# Patient Record
Sex: FEMALE | Race: WHITE | NOT HISPANIC OR LATINO | URBAN - NONMETROPOLITAN AREA
[De-identification: names, ages, dates, MRNs, and addresses within clinical notes are randomized per-mention and may not be internally consistent; named-entity substitution may affect disease eponyms.]

---

## 2019-01-04 ENCOUNTER — HOSPITAL ENCOUNTER (EMERGENCY)
Facility: HOSPITAL | Age: 39
Discharge: PSYCHIATRIC OR SUBSTANCE USE FACILITY | End: 2019-01-05
Attending: EMERGENCY MEDICINE | Admitting: NURSE PRACTITIONER

## 2019-01-04 VITALS
WEIGHT: 130 LBS | RESPIRATION RATE: 16 BRPM | HEIGHT: 62 IN | DIASTOLIC BLOOD PRESSURE: 65 MMHG | SYSTOLIC BLOOD PRESSURE: 109 MMHG | TEMPERATURE: 97.7 F | BODY MASS INDEX: 23.92 KG/M2 | OXYGEN SATURATION: 98 % | HEART RATE: 106 BPM

## 2019-01-04 DIAGNOSIS — F19.10 POLYSUBSTANCE ABUSE (HCC): Primary | ICD-10-CM

## 2019-01-04 LAB
6-ACETYL MORPHINE: NEGATIVE
ALBUMIN SERPL-MCNC: 4.4 G/DL (ref 3.5–5)
ALBUMIN/GLOB SERPL: 1.1 G/DL (ref 1.5–2.5)
ALP SERPL-CCNC: 67 U/L (ref 35–104)
ALT SERPL W P-5'-P-CCNC: 27 U/L (ref 10–36)
AMORPH URATE CRY URNS QL MICRO: ABNORMAL /HPF
AMPHET+METHAMPHET UR QL: POSITIVE
ANION GAP SERPL CALCULATED.3IONS-SCNC: 10.6 MMOL/L (ref 3.6–11.2)
AST SERPL-CCNC: 32 U/L (ref 10–30)
B-HCG UR QL: NEGATIVE
BACTERIA UR QL AUTO: ABNORMAL /HPF
BARBITURATES UR QL SCN: NEGATIVE
BASOPHILS # BLD AUTO: 0.02 10*3/MM3 (ref 0–0.3)
BASOPHILS NFR BLD AUTO: 0.2 % (ref 0–2)
BENZODIAZ UR QL SCN: NEGATIVE
BILIRUB SERPL-MCNC: 0.4 MG/DL (ref 0.2–1.8)
BILIRUB UR QL STRIP: NEGATIVE
BUN BLD-MCNC: 14 MG/DL (ref 7–21)
BUN/CREAT SERPL: 21.2 (ref 7–25)
BUPRENORPHINE SERPL-MCNC: POSITIVE NG/ML
CALCIUM SPEC-SCNC: 9.2 MG/DL (ref 7.7–10)
CANNABINOIDS SERPL QL: NEGATIVE
CHLORIDE SERPL-SCNC: 103 MMOL/L (ref 99–112)
CLARITY UR: ABNORMAL
CO2 SERPL-SCNC: 23.4 MMOL/L (ref 24.3–31.9)
COCAINE UR QL: NEGATIVE
COLOR UR: ABNORMAL
CREAT BLD-MCNC: 0.66 MG/DL (ref 0.43–1.29)
DEPRECATED RDW RBC AUTO: 46.6 FL (ref 37–54)
EOSINOPHIL # BLD AUTO: 0.16 10*3/MM3 (ref 0–0.7)
EOSINOPHIL NFR BLD AUTO: 1.4 % (ref 0–5)
ERYTHROCYTE [DISTWIDTH] IN BLOOD BY AUTOMATED COUNT: 17.4 % (ref 11.5–14.5)
ETHANOL BLD-MCNC: <10 MG/DL
ETHANOL UR QL: <0.01 %
GFR SERPL CREATININE-BSD FRML MDRD: 101 ML/MIN/1.73
GLOBULIN UR ELPH-MCNC: 3.9 GM/DL
GLUCOSE BLD-MCNC: 83 MG/DL (ref 70–110)
GLUCOSE UR STRIP-MCNC: NEGATIVE MG/DL
HCT VFR BLD AUTO: 30.7 % (ref 37–47)
HGB BLD-MCNC: 9.1 G/DL (ref 12–16)
HGB UR QL STRIP.AUTO: NEGATIVE
HYALINE CASTS UR QL AUTO: ABNORMAL /LPF
IMM GRANULOCYTES # BLD AUTO: 0.03 10*3/MM3 (ref 0–0.03)
IMM GRANULOCYTES NFR BLD AUTO: 0.3 % (ref 0–0.5)
KETONES UR QL STRIP: ABNORMAL
LEUKOCYTE ESTERASE UR QL STRIP.AUTO: NEGATIVE
LYMPHOCYTES # BLD AUTO: 1.17 10*3/MM3 (ref 1–3)
LYMPHOCYTES NFR BLD AUTO: 10 % (ref 21–51)
MCH RBC QN AUTO: 23.1 PG (ref 27–33)
MCHC RBC AUTO-ENTMCNC: 29.6 G/DL (ref 33–37)
MCV RBC AUTO: 77.9 FL (ref 80–94)
METHADONE UR QL SCN: NEGATIVE
MONOCYTES # BLD AUTO: 0.59 10*3/MM3 (ref 0.1–0.9)
MONOCYTES NFR BLD AUTO: 5 % (ref 0–10)
NEUTROPHILS # BLD AUTO: 9.73 10*3/MM3 (ref 1.4–6.5)
NEUTROPHILS NFR BLD AUTO: 83.1 % (ref 30–70)
NITRITE UR QL STRIP: NEGATIVE
OPIATES UR QL: NEGATIVE
OSMOLALITY SERPL CALC.SUM OF ELEC: 273.4 MOSM/KG (ref 273–305)
OXYCODONE UR QL SCN: NEGATIVE
PCP UR QL SCN: NEGATIVE
PH UR STRIP.AUTO: 6 [PH] (ref 5–8)
PLATELET # BLD AUTO: 759 10*3/MM3 (ref 130–400)
PMV BLD AUTO: 9.6 FL (ref 6–10)
POTASSIUM BLD-SCNC: 3.4 MMOL/L (ref 3.5–5.3)
PROT SERPL-MCNC: 8.3 G/DL (ref 6–8)
PROT UR QL STRIP: ABNORMAL
RBC # BLD AUTO: 3.94 10*6/MM3 (ref 4.2–5.4)
RBC # UR: ABNORMAL /HPF
REF LAB TEST METHOD: ABNORMAL
SODIUM BLD-SCNC: 137 MMOL/L (ref 135–153)
SP GR UR STRIP: >1.03 (ref 1–1.03)
SQUAMOUS #/AREA URNS HPF: ABNORMAL /HPF
UROBILINOGEN UR QL STRIP: ABNORMAL
WBC NRBC COR # BLD: 11.7 10*3/MM3 (ref 4.5–12.5)
WBC UR QL AUTO: ABNORMAL /HPF

## 2019-01-04 PROCEDURE — 80307 DRUG TEST PRSMV CHEM ANLYZR: CPT | Performed by: NURSE PRACTITIONER

## 2019-01-04 PROCEDURE — 85025 COMPLETE CBC W/AUTO DIFF WBC: CPT | Performed by: NURSE PRACTITIONER

## 2019-01-04 PROCEDURE — 81025 URINE PREGNANCY TEST: CPT | Performed by: NURSE PRACTITIONER

## 2019-01-04 PROCEDURE — 81001 URINALYSIS AUTO W/SCOPE: CPT | Performed by: NURSE PRACTITIONER

## 2019-01-04 PROCEDURE — 80053 COMPREHEN METABOLIC PANEL: CPT | Performed by: NURSE PRACTITIONER

## 2019-01-04 PROCEDURE — 99283 EMERGENCY DEPT VISIT LOW MDM: CPT

## 2019-01-04 RX ORDER — POTASSIUM CHLORIDE 20 MEQ/1
20 TABLET, EXTENDED RELEASE ORAL ONCE
Status: DISCONTINUED | OUTPATIENT
Start: 2019-01-04 | End: 2019-01-05 | Stop reason: HOSPADM

## 2019-01-05 ENCOUNTER — HOSPITAL ENCOUNTER (INPATIENT)
Facility: HOSPITAL | Age: 39
LOS: 1 days | Discharge: HOME | End: 2019-01-06
Attending: PSYCHIATRY & NEUROLOGY | Admitting: PSYCHIATRY & NEUROLOGY

## 2019-01-05 PROBLEM — F19.10 POLYSUBSTANCE ABUSE (HCC): Status: ACTIVE | Noted: 2019-01-05

## 2019-01-05 LAB
HIV1+2 AB SER QL: NORMAL
POTASSIUM BLD-SCNC: 3.3 MMOL/L (ref 3.5–5.3)

## 2019-01-05 PROCEDURE — 93010 ELECTROCARDIOGRAM REPORT: CPT | Performed by: INTERNAL MEDICINE

## 2019-01-05 PROCEDURE — 84132 ASSAY OF SERUM POTASSIUM: CPT | Performed by: PSYCHIATRY & NEUROLOGY

## 2019-01-05 PROCEDURE — 99221 1ST HOSP IP/OBS SF/LOW 40: CPT | Performed by: PSYCHIATRY & NEUROLOGY

## 2019-01-05 PROCEDURE — HZ2ZZZZ DETOXIFICATION SERVICES FOR SUBSTANCE ABUSE TREATMENT: ICD-10-PCS | Performed by: PSYCHIATRY & NEUROLOGY

## 2019-01-05 PROCEDURE — G0432 EIA HIV-1/HIV-2 SCREEN: HCPCS | Performed by: PSYCHIATRY & NEUROLOGY

## 2019-01-05 PROCEDURE — 93005 ELECTROCARDIOGRAM TRACING: CPT | Performed by: PSYCHIATRY & NEUROLOGY

## 2019-01-05 RX ORDER — TRAZODONE HYDROCHLORIDE 50 MG/1
50 TABLET ORAL NIGHTLY PRN
Status: DISCONTINUED | OUTPATIENT
Start: 2019-01-05 | End: 2019-01-06 | Stop reason: HOSPADM

## 2019-01-05 RX ORDER — BENZONATATE 100 MG/1
100 CAPSULE ORAL 3 TIMES DAILY PRN
Status: DISCONTINUED | OUTPATIENT
Start: 2019-01-05 | End: 2019-01-06 | Stop reason: HOSPADM

## 2019-01-05 RX ORDER — DICYCLOMINE HYDROCHLORIDE 10 MG/1
10 CAPSULE ORAL 3 TIMES DAILY PRN
Status: DISCONTINUED | OUTPATIENT
Start: 2019-01-05 | End: 2019-01-06 | Stop reason: HOSPADM

## 2019-01-05 RX ORDER — HYDROXYZINE 50 MG/1
50 TABLET, FILM COATED ORAL 3 TIMES DAILY PRN
Status: DISCONTINUED | OUTPATIENT
Start: 2019-01-05 | End: 2019-01-06 | Stop reason: HOSPADM

## 2019-01-05 RX ORDER — CLONIDINE HYDROCHLORIDE 0.1 MG/1
0.1 TABLET ORAL 3 TIMES DAILY PRN
Status: DISCONTINUED | OUTPATIENT
Start: 2019-01-06 | End: 2019-01-06 | Stop reason: HOSPADM

## 2019-01-05 RX ORDER — ECHINACEA PURPUREA EXTRACT 125 MG
2 TABLET ORAL AS NEEDED
Status: DISCONTINUED | OUTPATIENT
Start: 2019-01-05 | End: 2019-01-06 | Stop reason: HOSPADM

## 2019-01-05 RX ORDER — CLONIDINE HYDROCHLORIDE 0.1 MG/1
0.1 TABLET ORAL 4 TIMES DAILY PRN
Status: DISCONTINUED | OUTPATIENT
Start: 2019-01-05 | End: 2019-01-06 | Stop reason: HOSPADM

## 2019-01-05 RX ORDER — CLONIDINE HYDROCHLORIDE 0.1 MG/1
0.1 TABLET ORAL 4 TIMES DAILY PRN
Status: ACTIVE | OUTPATIENT
Start: 2019-01-05 | End: 2019-01-06

## 2019-01-05 RX ORDER — BENZTROPINE MESYLATE 1 MG/ML
0.5 INJECTION INTRAMUSCULAR; INTRAVENOUS DAILY PRN
Status: DISCONTINUED | OUTPATIENT
Start: 2019-01-05 | End: 2019-01-06 | Stop reason: HOSPADM

## 2019-01-05 RX ORDER — LOPERAMIDE HYDROCHLORIDE 2 MG/1
2 CAPSULE ORAL 4 TIMES DAILY PRN
Status: DISCONTINUED | OUTPATIENT
Start: 2019-01-05 | End: 2019-01-06 | Stop reason: HOSPADM

## 2019-01-05 RX ORDER — CLONIDINE HYDROCHLORIDE 0.1 MG/1
0.1 TABLET ORAL 2 TIMES DAILY PRN
Status: DISCONTINUED | OUTPATIENT
Start: 2019-01-07 | End: 2019-01-06 | Stop reason: HOSPADM

## 2019-01-05 RX ORDER — CLONIDINE HYDROCHLORIDE 0.1 MG/1
0.1 TABLET ORAL ONCE AS NEEDED
Status: DISCONTINUED | OUTPATIENT
Start: 2019-01-08 | End: 2019-01-06 | Stop reason: HOSPADM

## 2019-01-05 RX ORDER — BENZTROPINE MESYLATE 1 MG/1
1 TABLET ORAL DAILY PRN
Status: DISCONTINUED | OUTPATIENT
Start: 2019-01-05 | End: 2019-01-06 | Stop reason: HOSPADM

## 2019-01-05 RX ORDER — SULFAMETHOXAZOLE AND TRIMETHOPRIM 800; 160 MG/1; MG/1
1 TABLET ORAL EVERY 12 HOURS SCHEDULED
Status: DISCONTINUED | OUTPATIENT
Start: 2019-01-05 | End: 2019-01-06 | Stop reason: HOSPADM

## 2019-01-05 RX ORDER — CYCLOBENZAPRINE HCL 10 MG
10 TABLET ORAL 3 TIMES DAILY PRN
Status: DISCONTINUED | OUTPATIENT
Start: 2019-01-05 | End: 2019-01-06 | Stop reason: HOSPADM

## 2019-01-05 RX ORDER — FAMOTIDINE 20 MG/1
20 TABLET, FILM COATED ORAL 2 TIMES DAILY PRN
Status: DISCONTINUED | OUTPATIENT
Start: 2019-01-05 | End: 2019-01-06 | Stop reason: HOSPADM

## 2019-01-05 RX ORDER — NICOTINE 21 MG/24HR
1 PATCH, TRANSDERMAL 24 HOURS TRANSDERMAL EVERY 24 HOURS
Status: DISCONTINUED | OUTPATIENT
Start: 2019-01-05 | End: 2019-01-06 | Stop reason: HOSPADM

## 2019-01-05 RX ORDER — ALUMINA, MAGNESIA, AND SIMETHICONE 2400; 2400; 240 MG/30ML; MG/30ML; MG/30ML
15 SUSPENSION ORAL EVERY 6 HOURS PRN
Status: DISCONTINUED | OUTPATIENT
Start: 2019-01-05 | End: 2019-01-06 | Stop reason: HOSPADM

## 2019-01-05 RX ORDER — ONDANSETRON 4 MG/1
4 TABLET, FILM COATED ORAL 3 TIMES DAILY PRN
Status: DISCONTINUED | OUTPATIENT
Start: 2019-01-05 | End: 2019-01-06 | Stop reason: HOSPADM

## 2019-01-05 RX ORDER — POTASSIUM CHLORIDE 20 MEQ/1
40 TABLET, EXTENDED RELEASE ORAL EVERY 4 HOURS
Status: ACTIVE | OUTPATIENT
Start: 2019-01-05 | End: 2019-01-05

## 2019-01-05 RX ORDER — POTASSIUM CHLORIDE 20 MEQ/1
40 TABLET, EXTENDED RELEASE ORAL EVERY 4 HOURS
Status: DISCONTINUED | OUTPATIENT
Start: 2019-01-05 | End: 2019-01-05 | Stop reason: SDUPTHER

## 2019-01-05 RX ADMIN — HYDROXYZINE HYDROCHLORIDE 50 MG: 50 TABLET, FILM COATED ORAL at 21:03

## 2019-01-05 RX ADMIN — CYCLOBENZAPRINE HYDROCHLORIDE 10 MG: 10 TABLET, FILM COATED ORAL at 21:03

## 2019-01-05 RX ADMIN — SULFAMETHOXAZOLE AND TRIMETHOPRIM 160 MG: 800; 160 TABLET ORAL at 16:00

## 2019-01-05 RX ADMIN — SULFAMETHOXAZOLE AND TRIMETHOPRIM 160 MG: 800; 160 TABLET ORAL at 20:57

## 2019-01-05 RX ADMIN — HYDROXYZINE HYDROCHLORIDE 50 MG: 50 TABLET, FILM COATED ORAL at 01:52

## 2019-01-05 RX ADMIN — TRAZODONE HYDROCHLORIDE 50 MG: 50 TABLET ORAL at 01:52

## 2019-01-05 RX ADMIN — CYCLOBENZAPRINE HYDROCHLORIDE 10 MG: 10 TABLET, FILM COATED ORAL at 01:52

## 2019-01-05 RX ADMIN — CYCLOBENZAPRINE HYDROCHLORIDE 10 MG: 10 TABLET, FILM COATED ORAL at 12:23

## 2019-01-05 RX ADMIN — DICYCLOMINE HYDROCHLORIDE 10 MG: 10 CAPSULE ORAL at 01:52

## 2019-01-05 RX ADMIN — TRAZODONE HYDROCHLORIDE 50 MG: 50 TABLET ORAL at 21:04

## 2019-01-05 RX ADMIN — HYDROXYZINE HYDROCHLORIDE 50 MG: 50 TABLET, FILM COATED ORAL at 12:23

## 2019-01-05 RX ADMIN — POTASSIUM CHLORIDE 40 MEQ: 1500 TABLET, EXTENDED RELEASE ORAL at 16:00

## 2019-01-05 NOTE — NURSING NOTE
Spoke to Dr. Arce  via phone. Intake information provided. Carefully discuss labs, MD advised H &H acceptable. Instructed to admit the patient. Admit orders received.clonidine detox admission, routine orders RBVOx2.. Patient and ed provider made aware of plan of care. Safety precautions maintained.

## 2019-01-05 NOTE — NURSING NOTE
Patient pockets emptied. Search completed with two staff members present. Items logged and placed in cabinet in intake area. Pt placed in safe room for evaluation. Will continue to monitor pt status. Waiting for ED provider to clear pt medically

## 2019-01-05 NOTE — PLAN OF CARE
Problem: Patient Care Overview  Goal: Plan of Care Review  Outcome: Ongoing (interventions implemented as appropriate)   01/05/19 0030   Coping/Psychosocial   Plan of Care Reviewed With patient   Coping/Psychosocial   Patient Agreement with Plan of Care agrees   Plan of Care Review   Progress no change   OTHER   Outcome Summary Pt rates anxiety and depression 9/10. Pt having mild WD's and craving 6/10. Pt will be discharged [facility] [place] [facility].
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
## 2019-01-05 NOTE — PLAN OF CARE
Problem: Overarching Goals (Adult)  Goal: Adheres to Safety Considerations for Self and Others  Outcome: Ongoing (interventions implemented as appropriate)    Goal: Optimized Coping Skills in Response to Life Stressors  Outcome: Ongoing (interventions implemented as appropriate)    Goal: Develops/Participates in [facility] [place] to Support Successful Transition  Outcome: Ongoing (interventions implemented as appropriate)      Problem: Impaired Control (Excessive Substance Use) (Adult)  Goal: Participates in Recovery Program (Excessive Substance Use)  Outcome: Ongoing (interventions implemented as appropriate)      Problem: Social/Occupational/Functional Impairment (Excessive Substance Use) (Adult)  Goal: Improved Social/Occupational/Functional Skills (Excessive Substance Use)  Outcome: Ongoing (interventions implemented as appropriate)      Problem: Safety Awareness Impairment (Excessive Substance Use) (Adult)  Goal: Enhanced Safety Awareness (Excessive Substance Use)  Outcome: Ongoing (interventions implemented as appropriate)      Problem: Physiological Impairment (Excessive Substance Use) (Adult)  Goal: Improved Physiologic Symptoms (Excessive Substance Use)  Outcome: Ongoing (interventions implemented as appropriate)
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
## 2019-01-05 NOTE — NURSING NOTE
"Pt present to ER with State  Kimmy Sandhu, requesting detox.  Kimmy states” José Manuel or Aria from \[facility]\"" will be in contact if pt is admitted for possible long term treatment.   Intake assessment completed at this time. Pt very tearful during assessment. Pt has poly substance, Heroin, Neurontin, Suboxone, Meth use route IV.  Pt states \" I have used something since I was 14 yrs old when I started drinking. I advanced to Heroin when I meet my  a few yrs ago. I went to [facility] in Sept and I was clean for 3 months and felt the best I ave in my life.  I got out on dec19th. And I used again with my  when he came back from [place].\"   Pt states\" I have a really rough year  Josephband took son to [place] in Jan 2018 and hadn’t seen him until [place] of this 2017.  I stayed here and ran the streets lived my life on drugs until about September 18th   I went to [facility], and released on Dec 19 th 2018.   My  had an open case in  case in [place]  and was on the run.  So he came back here and tonight he overdosed on Heroin in our living room floor.  He is at [facility] in [place]. My friend Kimmy said she would come with me here.  PT denies SI and HI  Rates anxiety 10 depression 10.  Denies FER. PT states\" I got clean in September and I want to be normal again.\"   Intake process explained pt agreeable.  Pt placed in treatment room will continue to monitor for safety.   "

## 2019-01-05 NOTE — ED PROVIDER NOTES
"Subjective     History provided by:  Patient   used: No    Drug / Alcohol Assessment   Primary symptoms include agitation. This is a new problem. Suspected agents include methamphetamines and prescription drugs. Associated symptoms include nausea. Associated medical issues include addiction treatment.       Review of Systems   Constitutional: Positive for activity change and chills.   HENT: Negative.    Eyes: Negative.    Respiratory: Negative.    Gastrointestinal: Positive for nausea.   Endocrine: Negative.    Genitourinary: Negative.    Musculoskeletal: Negative.    Skin: Negative.    Allergic/Immunologic: Negative.    Neurological: Negative.    Hematological: Negative.    Psychiatric/Behavioral: Positive for agitation, behavioral problems and sleep disturbance. The patient is nervous/anxious.        Past Medical History:   Diagnosis Date   • Liver disease     hep c   • OCD (obsessive compulsive disorder)     dx at 9 yrs old   • Panic disorder     dx at 9yrs old   • Substance abuse (CMS/HCC)        Allergies   Allergen Reactions   • Toradol [Ketorolac Tromethamine] Shortness Of Breath   • Risperidone Other (See Comments)     \"WRY NECK\" MUSCLES BECOME RIGID IN NECK       Past Surgical History:   Procedure Laterality Date   •  SECTION     • HAND SURGERY         Family History   Problem Relation Age of Onset   • Depression Father        Social History     Socioeconomic History   • Marital status:      Spouse name: Not on file   • Number of children: Not on file   • Years of education: Not on file   • Highest education level: Not on file   Tobacco Use   • Smoking status: Current Every Day Smoker   • Tobacco comment:  e-cigarettes    Substance and Sexual Activity   • Alcohol use: No     Frequency: Never   • Drug use: Yes     Types: Methamphetamines     Comment: Suboxone, Neurotinon    • Sexual activity: Yes     Partners: Male           Objective   Physical Exam   Constitutional: She " is oriented to person, place, and time. She appears well-developed and well-nourished.   HENT:   Head: Normocephalic.   Eyes: EOM are normal. Pupils are equal, round, and reactive to light.   Neck: Normal range of motion. Neck supple.   Cardiovascular: Regular rhythm, normal heart sounds and intact distal pulses.   tachycardic   Pulmonary/Chest: Effort normal and breath sounds normal.   Abdominal: Soft. Bowel sounds are normal.   Musculoskeletal: Normal range of motion.   Neurological: She is alert and oriented to person, place, and time.   Skin: Skin is warm. Capillary refill takes less than 2 seconds.   Psychiatric: Her mood appears anxious. Her speech is delayed. She is agitated and aggressive.   Tearful    Nursing note and vitals reviewed.      Procedures           ED Course                  MDM  Number of Diagnoses or Management Options  Polysubstance abuse (CMS/HCC): new and requires workup     Amount and/or Complexity of Data Reviewed  Clinical lab tests: ordered and reviewed  Tests in the medicine section of CPT®: ordered  Decide to obtain previous medical records or to obtain history from someone other than the patient: yes    Risk of Complications, Morbidity, and/or Mortality  Presenting problems: moderate  Diagnostic procedures: moderate  Management options: moderate    Patient Progress  Patient progress: improved        Final diagnoses:   Polysubstance abuse (CMS/HCC)            Diane العراقي, APRN  01/05/19 0359
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
## 2019-01-06 VITALS
WEIGHT: 129.2 LBS | DIASTOLIC BLOOD PRESSURE: 57 MMHG | HEART RATE: 87 BPM | TEMPERATURE: 97.7 F | RESPIRATION RATE: 16 BRPM | BODY MASS INDEX: 23.77 KG/M2 | SYSTOLIC BLOOD PRESSURE: 107 MMHG | OXYGEN SATURATION: 98 % | HEIGHT: 62 IN

## 2019-01-06 LAB — POTASSIUM BLD-SCNC: 3.9 MMOL/L (ref 3.5–5.3)

## 2019-01-06 PROCEDURE — 99238 HOSP IP/OBS DSCHRG MGMT 30/<: CPT | Performed by: PSYCHIATRY & NEUROLOGY

## 2019-01-06 PROCEDURE — 84132 ASSAY OF SERUM POTASSIUM: CPT | Performed by: PSYCHIATRY & NEUROLOGY

## 2019-01-06 RX ORDER — SULFAMETHOXAZOLE AND TRIMETHOPRIM 800; 160 MG/1; MG/1
1 TABLET ORAL EVERY 12 HOURS SCHEDULED
Qty: 3 TABLET | Refills: 0 | Status: SHIPPED | OUTPATIENT
Start: 2019-01-06 | End: 2019-01-08

## 2019-01-06 RX ADMIN — CYCLOBENZAPRINE HYDROCHLORIDE 10 MG: 10 TABLET, FILM COATED ORAL at 14:39

## 2019-01-06 RX ADMIN — SULFAMETHOXAZOLE AND TRIMETHOPRIM 160 MG: 800; 160 TABLET ORAL at 08:21

## 2019-01-06 RX ADMIN — HYDROXYZINE HYDROCHLORIDE 50 MG: 50 TABLET, FILM COATED ORAL at 08:22

## 2019-01-06 NOTE — DISCHARGE SUMMARY
Date of Discharge:  1/6/2019    Discharge Diagnosis:Active Problems:    Polysubstance abuse (CMS/HCC)        Presenting Problem/History of Present Illness  Polysubstance abuse (CMS/HCC) [F19.10]     Hospital Course  Patient is a 38 y.o. female presented with polysubstance abuse. She was planning to go to [facility] for rehab and was notified to go to the [facility] for detox prior to admission to [facility]. She denied SI/HI/AVH. She had some depression and anxiety with her relapse and her  overdosing from drugs. He is stable  But she is fearful her kids will be taken away. She had been using substances for the past 2 weeks, prior to that was in [facility]. She had minimal withdrawal symptoms. She denied any physical symptoms today and felt ready for discharge to [facility]. Therapist and staff contacted facility and they are able to transport her to the facility. She was found to have a UTI and started on Bactrium for 3 days.     Procedures Performed - none         Consults:   Consults     No orders found for last 30 day(s).          Pertinent Test Results:   Collected  Updated  Procedure  Result Status      01/06/2019 0044  01/06/2019 0122  Potassium [[id number]]   Blood     Final result  Potassium 3.9 mmol/L          01/05/2019 0600  01/05/2019 0733  HIV-1 / O / 2 Ag / Antibody 4th Generation [[id number]]    Blood     Final result  HIV-1/ HIV-2 Non-Reactive          01/05/2019 0600  01/05/2019 0634  Potassium [[id number]]   (Abnormal)   Blood     Final result  Potassium 3.3 Abnormally low  mmol/L          01/04/2019 2334  01/04/2019 2339  Pregnancy, Urine - Urine, Clean Catch [[id number]]    Urine, Clean Catch     Final result  HCG, Urine QL Negative          01/04/2019 2334  01/04/2019 2355  Urine Drug Screen - Urine, Clean Catch [[id number]]    (Abnormal)   Urine, Clean Catch     Final result  Amphetamine Screen, Urine Positive Abnormal    Barbiturates Screen, Urine Negative   Benzodiazepine Screen, Urine  Negative   Cocaine Screen, Urine Negative   Methadone Screen , Urine Negative   Opiate Screen, Urine Negative   Phencyclidine (PCP), Urine Negative   THC Screen, Urine Negative   6-ACETYL MORPHINE Negative   Buprenorphine, Screen, Urine Positive Abnormal    Oxycodone Screen, Urine Negative          01/04/2019 2334 01/04/2019 2342  Urinalysis With Microscopic If Indicated (No Culture) - Urine, Clean Catch [[id number]]   (Abnormal)   Urine, Clean Catch     Final result  Color, UA Dark Yellow Abnormal    Appearance, UA Turbid Abnormal    pH, UA 6.0   Specific Gravity, UA >1.030 Abnormally high    Glucose, UA Negative   Ketones, UA 80 mg/dL (3+) Abnormal    Bilirubin, UA Negative   Blood, UA Negative   Protein, UA 30 mg/dL (1+) Abnormal    Leuk Esterase, UA Negative   Nitrite, UA Negative   Urobilinogen, UA 1.0 E.U./dL          01/04/2019 2334 01/04/2019 2351  Urinalysis, Microscopic Only - Urine, Clean Catch [[id number]]   (Abnormal)   Urine, Clean Catch     Final result  RBC, UA None Seen /HPF   WBC, UA 0-2 /HPF   Bacteria, UA Trace Abnormal  /HPF   Squamous Epithelial Cells, UA 3-6 Abnormal  /HPF   Hyaline Casts, UA 0-2 /LPF   Amorphous Crystals, UA Moderate/2+ /HPF   Methodology: Manual Light Microscopy           01/04/2019 2240 01/04/2019 2315  Comprehensive Metabolic Panel [[id number]]   (Abnormal)   Blood from Foot, Left     Final result  Glucose 83 mg/dL   BUN 14 mg/dL   Creatinine 0.66 mg/dL   Sodium 137 mmol/L   Potassium 3.4 Abnormally low  mmol/L   Chloride 103 mmol/L   CO2 23.4 Abnormally low  mmol/L   Calcium 9.2 mg/dL   Total Protein 8.3 Abnormally high  g/dL   Albumin 4.40 g/dL   ALT (SGPT) 27 U/L   AST (SGOT) 32 Abnormally high  U/L   Alkaline Phosphatase 67  U/L   Total Bilirubin 0.4 mg/dL   eGFR Non African Amer 101 mL/min/1.73   Globulin 3.9 gm/dL   A/G Ratio 1.1 Abnormally low  g/dL   BUN/Creatinine Ratio 21.2    Anion Gap 10.6 mmol/L          01/04/2019 2240 01/04/2019 2325  Ethanol [[id number]]     Blood from Foot, Left     Final result  Ethanol <10 mg/dL   Ethanol % <0.010 %          01/04/2019 2240 01/04/2019 2250  CBC Auto Differential [[id number]]   (Abnormal)   Blood from Foot, Left     Final result  WBC 11.70 10*3/mm3   RBC 3.94 Abnormally low  10*6/mm3   Hemoglobin 9.1 Abnormally low  g/dL   Hematocrit 30.7 Abnormally low  %   MCV 77.9 Abnormally low  fL   MCH 23.1 Abnormally low  pg   MCHC 29.6 Abnormally low  g/dL   RDW 17.4 Abnormally high  %   RDW-SD 46.6 fl   MPV 9.6 fL   Platelets 759 Abnormally high  10*3/mm3   Neutrophil % 83.1 Abnormally high  %   Lymphocyte % 10.0 Abnormally low  %   Monocyte % 5.0 %   Eosinophil % 1.4 %   Basophil % 0.2 %   Immature Grans % 0.3 %   Neutrophils, Absolute 9.73 Abnormally high  10*3/mm3   Lymphocytes, Absolute 1.17 10*3/mm3   Monocytes, Absolute 0.59 10*3/mm3   Eosinophils, Absolute 0.16 10*3/mm3   Basophils, Absolute 0.02 10*3/mm3   Immature Grans, Absolute 0.03 10*3/mm3          01/04/2019 2240 01/04/2019 2315  Osmolality, Calculated [[id number]]   Blood from Foot, Left     Final result  Osmolality Calc 273.4 mOsm/kg            Condition on Discharge:  stable    Vital Signs  Temp:  [97.2 °F (36.2 °C)-98 °F (36.7 °C)] 97.4 °F (36.3 °C)  Heart Rate:  [72-98] 91  Resp:  [16-18] 18  BP: ()/(56-79) 118/79      [facility]    Discharge Medications     Discharge Medications      New Medications      Instructions Start Date   sulfamethoxazole-trimethoprim 800-160 MG per tablet  Commonly known as:  BACTRIM DS,SEPTRA DS   1 tablet, Oral, Every 12 Hours Scheduled             Discharge Diet: as tolerated    Activity at Discharge: as tolerated    Follow-up Appointments  Going to [facility]      Test Results Pending at Discharge - none       Janelle Arce MD  01/06/19  4:33 PM

## 2019-01-06 NOTE — PLAN OF CARE
Problem: Patient Care Overview  Goal: Plan of Care Review  Outcome: Ongoing (interventions implemented as appropriate)   01/06/19 0405   Coping/Psychosocial   Plan of Care Reviewed With patient   Coping/Psychosocial   Patient Agreement with Plan of Care agrees   Plan of Care Review   Progress improving   OTHER   Outcome Summary Patient has isolates in her room the entire shift. Rates anxiety 8. Depression 7. Denies cravings. Wd's legs aching. No problems noted. Will continue to monitor.       Problem: Overarching Goals (Adult)  Goal: Adheres to Safety Considerations for Self and Others  Outcome: Ongoing (interventions implemented as appropriate)    Goal: Optimized Coping Skills in Response to Life Stressors  Outcome: Ongoing (interventions implemented as appropriate)    Goal: Develops/Participates in Therapeutic [place] to Support Successful Transition  Outcome: Ongoing (interventions implemented as appropriate)

## 2019-01-06 NOTE — PROGRESS NOTES
"1    ID:Dominga Jain is a 38 y.o. female    CC: Detox to go to [facility] rehab    Interval History: Dominga presented to the ED at the request of [facility] rehab stating that she required detox before going to rehab. She stated she feels okay and denied a/e. She hopes to go to Rehab today, plans to talk to therapist and [facility] if available for discharge plans for today or tomorrow. Denied SI/HI/AVH.     Depression rating 6/10  Anxiety rating 4/10  Sleep: denied issues  Withdrawal sx: denied  Craving: denied/10    Review of Systems   Respiratory: Negative.    Cardiovascular: Negative.    Gastrointestinal: Negative.    Musculoskeletal: Negative.    Neurological: Negative.        Temp:  [97.2 °F (36.2 °C)-98 °F (36.7 °C)] 97.2 °F (36.2 °C)  Heart Rate:  [72-98] 82  Resp:  [12-18] 18  BP: ()/(56-72) 95/58    MENTAL STATUS EXAM:  Appearance:Neatly, casually dressed, good hygeine.   Cooperation:Cooperative  Orientation: Ox4  Gait and station stable.   Psychomotor: No psychomotor agitation/retardation, No EPS, No motor tics  Speech-normal rate, amount.  Mood \"okay\"   Affect-congruent/appropriate.  Thought Content-goal directed, no delusional material present  Thought process-linear, organized.  Suicidality: No SI  Homicidality: No HI  Perception: No AH/VH  Insight-fair   Judgement-fair    Lab Results (last 24 hours)     Procedure Component Value Units Date/Time    Potassium [[id number]]  (Normal) Collected:  01/06/19 0044    Specimen:  Blood Updated:  01/06/19 0122     Potassium 3.9 mmol/L           ALLERGIES: Toradol [ketorolac tromethamine] and Risperidone      Current Facility-Administered Medications:   •  aluminum-magnesium hydroxide-simethicone (MAALOX MAX) 400-400-40 MG/5ML suspension 15 mL, 15 mL, Oral, Q6H PRN, Janelle Arce MD  •  benzonatate (TESSALON) capsule 100 mg, 100 mg, Oral, TID PRN, Janelle Arce MD  •  benztropine (COGENTIN) tablet 1 mg, 1 mg, Oral, Daily PRN **OR** benztropine (COGENTIN) " injection 0.5 mg, 0.5 mg, Intramuscular, Daily PRN, Janelle Arce MD  •  CloNIDine (CATAPRES) tablet 0.1 mg, 0.1 mg, Oral, 4x Daily PRN, Janelle Arce MD  •  [] CloNIDine (CATAPRES) tablet 0.1 mg, 0.1 mg, Oral, 4x Daily PRN **FOLLOWED BY** CloNIDine (CATAPRES) tablet 0.1 mg, 0.1 mg, Oral, TID PRN **FOLLOWED BY** [START ON 2019] CloNIDine (CATAPRES) tablet 0.1 mg, 0.1 mg, Oral, BID PRN **FOLLOWED BY** [START ON 2019] CloNIDine (CATAPRES) tablet 0.1 mg, 0.1 mg, Oral, Once PRN, Janelle Arce MD  •  cyclobenzaprine (FLEXERIL) tablet 10 mg, 10 mg, Oral, TID PRN, Janelle Arce MD, 10 mg at 19 2103  •  dicyclomine (BENTYL) capsule 10 mg, 10 mg, Oral, TID PRN, Janelle Arce MD, 10 mg at 19 0152  •  famotidine (PEPCID) tablet 20 mg, 20 mg, Oral, BID PRN, Janelle Arce MD  •  hydrOXYzine (ATARAX) tablet 50 mg, 50 mg, Oral, TID PRN, Janelle Arce MD, 50 mg at 19 0822  •  Influenza Vac Subunit Quad (FLUCELVAX) injection 0.5 mL, 0.5 mL, Intramuscular, Once, Janelle Arce MD  •  loperamide (IMODIUM) capsule 2 mg, 2 mg, Oral, 4x Daily PRN, Janelle Arce MD  •  magnesium hydroxide (MILK OF MAGNESIA) suspension 2400 mg/10mL 10 mL, 10 mL, Oral, Daily PRN, Janelle Arce MD  •  nicotine (NICODERM CQ) 21 MG/24HR patch 1 patch, 1 patch, Transdermal, Q24H, Janelle Arce MD  •  ondansetron (ZOFRAN) tablet 4 mg, 4 mg, Oral, TID PRN, Janelle Arec MD  •  sodium chloride (OCEAN) nasal spray 2 spray, 2 spray, Each Nare, PRN, Janelle Arce MD  •  sulfamethoxazole-trimethoprim (BACTRIM DS,SEPTRA DS) 800-160 MG per tablet 160 mg, 1 tablet, Oral, Q12H, Janelle Arce MD, 160 mg at 19 0821  •  traZODone (DESYREL) tablet 50 mg, 50 mg, Oral, Nightly PRN, Janelle Arce MD, 50 mg at 19 2104  •  aluminum-magnesium hydroxide-simethicone  •  benzonatate  •  benztropine **OR** benztropine  •  CloNIDine  •  [] CloNIDine **FOLLOWED BY** CloNIDine **FOLLOWED BY** [START ON 2019] CloNIDine **FOLLOWED BY**  [START ON 1/8/2019] CloNIDine  •  cyclobenzaprine  •  dicyclomine  •  famotidine  •  hydrOXYzine  •  loperamide  •  magnesium hydroxide  •  ondansetron  •  sodium chloride  •  traZODone    SAFETY PRECAUTIONS: SP LEVEL III    ASSESSMENT/PLAN:  Opiate use disorder, in withdrawal   -Clonidine detox protocol  -Comfort medications PRN   -Individual and group therapy  -She is not having major withdrawal symptoms, plan for therapist to work on discharge planning to [facility]. We need transportation to rehab center and planning for bed. It would be best to avoid relapse to have patient go from inpatient directly to rehab center.      Meth use disorder  -Comfort treatment  -Plans to go to rehab as noted above.      UTI  -Bactrim 3 days     Hep C  -Will need GI appointment          I have reviewed the treatment plan and agree with current plan.  Treatment was discussed with the patient who is agreeable to this treatment and plan.

## 2019-01-06 NOTE — PLAN OF CARE
Problem: Overarching Goals (Adult)  Goal: Develops/Participates in Therapeutic [place] to Support Successful Transition  Outcome: Ongoing (interventions implemented as appropriate)

## 2019-01-06 NOTE — PLAN OF CARE
Problem: Overarching Goals (Adult)  Goal: Develops/Participates in Therapeutic [place] to Support Successful Transition  Outcome: Ongoing (interventions implemented as appropriate)

## 2023-08-12 ENCOUNTER — HOSPITAL ENCOUNTER (EMERGENCY)
Facility: HOSPITAL | Age: 43
Discharge: LEFT BEFORE DISCHARGE | End: 2023-08-12

## 2023-08-12 PROCEDURE — 99211 OFF/OP EST MAY X REQ PHY/QHP: CPT
